# Patient Record
Sex: MALE | URBAN - METROPOLITAN AREA
[De-identification: names, ages, dates, MRNs, and addresses within clinical notes are randomized per-mention and may not be internally consistent; named-entity substitution may affect disease eponyms.]

---

## 2019-01-22 ENCOUNTER — OFFICE VISIT (OUTPATIENT)
Dept: URBAN - METROPOLITAN AREA CLINIC 23 | Facility: CLINIC | Age: 84
End: 2019-01-22
Payer: MEDICARE

## 2019-01-22 DIAGNOSIS — H47.291 OTHER OPTIC ATROPHY, RIGHT EYE: Primary | ICD-10-CM

## 2019-01-22 PROCEDURE — 99203 OFFICE O/P NEW LOW 30 MIN: CPT | Performed by: OPTOMETRIST

## 2019-01-22 ASSESSMENT — INTRAOCULAR PRESSURE
OS: 14
OD: 13

## 2019-01-22 NOTE — IMPRESSION/PLAN
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 Plan: Unable to do OCT or dilate due to pt not having a . Unable to see nerve OS or diabetic changes OU. Pt to return for dilation.